# Patient Record
Sex: MALE | Race: WHITE | NOT HISPANIC OR LATINO | Employment: OTHER | URBAN - METROPOLITAN AREA
[De-identification: names, ages, dates, MRNs, and addresses within clinical notes are randomized per-mention and may not be internally consistent; named-entity substitution may affect disease eponyms.]

---

## 2021-01-29 ENCOUNTER — CLINICAL SUPPORT (OUTPATIENT)
Dept: FAMILY MEDICINE CLINIC | Facility: HOSPITAL | Age: 81
End: 2021-01-29

## 2021-01-29 DIAGNOSIS — Z23 ENCOUNTER FOR IMMUNIZATION: Primary | ICD-10-CM

## 2021-01-29 PROCEDURE — 91301 SARS-COV-2 / COVID-19 MRNA VACCINE (MODERNA) 100 MCG: CPT | Performed by: ANESTHESIOLOGY

## 2021-01-29 PROCEDURE — 0011A SARS-COV-2 / COVID-19 MRNA VACCINE (MODERNA) 100 MCG: CPT | Performed by: ANESTHESIOLOGY

## 2021-03-02 ENCOUNTER — IMMUNIZATIONS (OUTPATIENT)
Dept: FAMILY MEDICINE CLINIC | Facility: HOSPITAL | Age: 81
End: 2021-03-02

## 2021-03-02 DIAGNOSIS — Z23 ENCOUNTER FOR IMMUNIZATION: Primary | ICD-10-CM

## 2021-03-02 PROCEDURE — 0012A SARS-COV-2 / COVID-19 MRNA VACCINE (MODERNA) 100 MCG: CPT | Performed by: PHYSICIAN ASSISTANT

## 2021-03-02 PROCEDURE — 91301 SARS-COV-2 / COVID-19 MRNA VACCINE (MODERNA) 100 MCG: CPT | Performed by: PHYSICIAN ASSISTANT

## 2021-05-27 ENCOUNTER — OFFICE VISIT (OUTPATIENT)
Dept: URGENT CARE | Facility: CLINIC | Age: 81
End: 2021-05-27
Payer: COMMERCIAL

## 2021-05-27 VITALS
OXYGEN SATURATION: 96 % | SYSTOLIC BLOOD PRESSURE: 152 MMHG | WEIGHT: 216 LBS | BODY MASS INDEX: 28.63 KG/M2 | DIASTOLIC BLOOD PRESSURE: 88 MMHG | RESPIRATION RATE: 16 BRPM | HEART RATE: 90 BPM | HEIGHT: 73 IN | TEMPERATURE: 97.3 F

## 2021-05-27 DIAGNOSIS — R22.9 SKIN MASS: Primary | ICD-10-CM

## 2021-05-27 PROCEDURE — 99213 OFFICE O/P EST LOW 20 MIN: CPT | Performed by: PHYSICIAN ASSISTANT

## 2021-05-27 PROCEDURE — S9088 SERVICES PROVIDED IN URGENT: HCPCS | Performed by: PHYSICIAN ASSISTANT

## 2021-05-27 RX ORDER — FINASTERIDE 5 MG/1
5 TABLET, FILM COATED ORAL
COMMUNITY
Start: 2020-11-18

## 2021-05-27 RX ORDER — SIMVASTATIN 20 MG
10 TABLET ORAL
COMMUNITY
Start: 2020-11-18

## 2021-05-27 RX ORDER — DIPHENOXYLATE HYDROCHLORIDE AND ATROPINE SULFATE 2.5; .025 MG/1; MG/1
1 TABLET ORAL DAILY
COMMUNITY

## 2021-05-28 NOTE — PATIENT INSTRUCTIONS
Follow-up with general surgery for further evaluation and possible biopsy lesion left wrist     General surgery card for Dr Sylwia Bunch given to patient and his wife

## 2021-05-28 NOTE — PROGRESS NOTES
[facility]        NAME: Candice Singh is a 78 y o  male  : 1940    MRN: [id number]  DATE: May 28, 2021  TIME: 10:27 AM    Assessment and Plan   Skin mass [R22 9]  1  Skin mass           Patient Instructions       Follow up with PCP in 3-5 days  Proceed to  ER if symptoms worsen  Chief Complaint     Chief Complaint   Patient presents with    skin lump     Patient presents for left sided painless wrist mass  He states he first noticed it approx 2 weeks ago  History of Present Illness       42-year-old male presents to the clinic with his wife for a painless mass on his left wrist   Patient states that 2 weeks ago he was working outdoors moving [employer] and [employer] and states that the mass suddenly appeared  Patient's wife states that at 4st the surrounding area was erythematous but that it resolved  Patient states that he thought it might be a spider bite at 1st and denies any discharge or drainage from the area streaking redness fevers, chills, pain with movement or to touch  Patient states that he is currently cancer free and states that he gets PET scans often prior to seeing his oncologist       Review of Systems   Review of Systems   Constitutional: Negative for chills and fever  Respiratory: Negative for chest tightness, shortness of breath and wheezing  Cardiovascular: Negative for chest pain and palpitations  Gastrointestinal: Negative for abdominal pain, constipation, diarrhea, nausea and vomiting  Musculoskeletal: Positive for joint swelling  Negative for arthralgias and myalgias  Skin: Negative for color change, rash and wound  Neurological: Negative for dizziness, weakness, light-headedness, numbness and headaches  All other systems reviewed and are negative          Current Medications       Current Outpatient Medications:     ASPIRIN 81 PO, Take 81 mg by mouth daily, Disp: , Rfl:     finasteride (PROSCAR) 5 mg tablet, Take 5 mg by mouth, Disp: , Rfl:    multivitamin (THERAGRAN) TABS, Take 1 tablet by mouth daily, Disp: , Rfl:     simvastatin (ZOCOR) 20 mg tablet, Take 10 mg by mouth, Disp: , Rfl:     Thyroid (LEVOTHYROXINE-LIOTHYRONINE PO), Take 100 mg by mouth, Disp: , Rfl:     Current Allergies     Allergies as of 05/27/2021 - Reviewed 05/27/2021   Allergen Reaction Noted    Demerol [meperidine] GI Intolerance 05/27/2021            The following portions of the patient's history were reviewed and updated as appropriate: allergies, current medications, past family history, past medical history, past social history, past surgical history and problem list      History reviewed  No pertinent past medical history  History reviewed  No pertinent surgical history  History reviewed  No pertinent family history  Medications have been verified  Objective   /88   Pulse 90   Temp (!) 97 3 °F (36 3 °C)   Resp 16   Ht 6' 1" (1 854 m)   Wt 98 kg (216 lb)   SpO2 96%   BMI 28 50 kg/m²   No LMP for male patient  Physical Exam     Physical Exam  Vitals signs and nursing note reviewed  Constitutional:       General: He is not in acute distress  Appearance: He is well-developed  He is not diaphoretic  HENT:      Head: Normocephalic and atraumatic  Pulmonary:      Effort: Pulmonary effort is normal    Musculoskeletal: Normal range of motion  Skin:     General: Skin is warm and dry  Capillary Refill: Capillary refill takes less than 2 seconds  Findings: Lesion present  Comments:   Solid round raised lesion noted to dorsal aspect of left wrist no TTP, full AROM of wrist without difficulty or pain area is indurated without any fluctuance and not movable under the skin  No discharge or drainage, no erythema, increased warmth or bruising noted  Neurological:      Mental Status: He is alert and oriented to person, place, and time

## 2021-06-16 ENCOUNTER — CONSULT (OUTPATIENT)
Dept: SURGERY | Facility: CLINIC | Age: 81
End: 2021-06-16
Payer: COMMERCIAL

## 2021-06-16 VITALS
HEART RATE: 96 BPM | SYSTOLIC BLOOD PRESSURE: 163 MMHG | TEMPERATURE: 97.3 F | BODY MASS INDEX: 28.36 KG/M2 | WEIGHT: 214 LBS | HEIGHT: 73 IN | DIASTOLIC BLOOD PRESSURE: 77 MMHG

## 2021-06-16 DIAGNOSIS — R22.32 MASS OF LEFT WRIST: Primary | ICD-10-CM

## 2021-06-16 PROCEDURE — 99203 OFFICE O/P NEW LOW 30 MIN: CPT | Performed by: SURGERY

## 2021-06-16 PROCEDURE — 1160F RVW MEDS BY RX/DR IN RCRD: CPT | Performed by: SURGERY

## 2021-06-16 NOTE — ASSESSMENT & PLAN NOTE
17-year-old male with a one-month history of an exophytic left wrist mass  Plan:  - Will plan on excision of the mass  Risks and benefits of surgery were reviewed and the patient was amenable  Specific risks reviewed include: post op seroma, hematoma, or recurrence of the mass  - Will schedule at the patient's earliest convenience

## 2021-06-16 NOTE — H&P (VIEW-ONLY)
Assessment/Plan:    Mass of left wrist  47-year-old male with a one-month history of an exophytic left wrist mass  Plan:  - Will plan on excision of the mass  Risks and benefits of surgery were reviewed and the patient was amenable  Specific risks reviewed include: post op seroma, hematoma, or recurrence of the mass  - Will schedule at the patient's earliest convenience  Diagnoses and all orders for this visit:    Mass of left wrist          Subjective:      Patient ID: Ashish Barfield is a 78 y o  male  47-year-old male presents to office with a left wrist mass  Mr Bibi Pollock states that he started noticing this mass approximately 1 month ago 2 days after taking down a tree  He states that it has grown in size, and has become more uncomfortable as he extends his wrist   He denies any drainage, or erythema around it, and it is always follow like a firm mass  Upon review of the chart a picture from 05/27/2021 shows a pink, pearly appearance of an exophytic mass over the extensor surface of his left wrist   Of note the patient does have a history of lung cancer status post radiation, and is due for a CT scan for surveillance this Friday  The following portions of the patient's history were reviewed and updated as appropriate:   He  has a past medical history of CAD (coronary artery disease), Colonic mass (11/27/2020), COPD (chronic obstructive pulmonary disease) ([address] ), Former smoker, History of DVT (deep vein thrombosis), History of first degree heart block, History of lung cancer (12/819), History of lymphoma (11/27/2019), 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), Hyperlipidemia, Hypertension, MI (myocardial infarction) ([address] ), PAD (peripheral artery disease) ([address] ), and Pneumonia  He   Patient Active Problem List    Diagnosis Date Noted    Mass of left wrist 06/16/2021     He  has a past surgical history that includes Cataract extraction (Left, 09/24/2018)  His family history is not on file    He  has no history on file for tobacco use, alcohol use, and drug use  Current Outpatient Medications   Medication Sig Dispense Refill    ASPIRIN 81 PO Take 81 mg by mouth daily      finasteride (PROSCAR) 5 mg tablet Take 5 mg by mouth      multivitamin (THERAGRAN) TABS Take 1 tablet by mouth daily      simvastatin (ZOCOR) 20 mg tablet Take 10 mg by mouth      Thyroid (LEVOTHYROXINE-LIOTHYRONINE PO) Take 100 mcg by mouth        No current facility-administered medications for this visit  Current Outpatient Medications on File Prior to Visit   Medication Sig    ASPIRIN 81 PO Take 81 mg by mouth daily    finasteride (PROSCAR) 5 mg tablet Take 5 mg by mouth    multivitamin (THERAGRAN) TABS Take 1 tablet by mouth daily    simvastatin (ZOCOR) 20 mg tablet Take 10 mg by mouth    Thyroid (LEVOTHYROXINE-LIOTHYRONINE PO) Take 100 mcg by mouth      No current facility-administered medications on file prior to visit  He is allergic to demerol [meperidine]       Review of Systems   Constitutional: Negative for appetite change, chills, diaphoresis and fever  HENT: Negative for nosebleeds and trouble swallowing  Eyes: Negative  Respiratory: Negative for cough, shortness of breath and wheezing  Cardiovascular: Negative for chest pain, palpitations and leg swelling  Gastrointestinal: Negative for abdominal distention, abdominal pain, nausea and vomiting  Genitourinary: Negative for difficulty urinating, flank pain and frequency  Musculoskeletal: Negative for arthralgias, joint swelling and myalgias  Left wrist pain  Skin: Negative for pallor and rash  Neurological: Negative for dizziness, facial asymmetry and speech difficulty  Hematological: Does not bruise/bleed easily  Psychiatric/Behavioral: Negative for agitation and confusion  All other systems reviewed and are negative          Objective:      /77   Pulse 96   Temp (!) 97 3 °F (36 3 °C)   Ht 6' 1" (1 854 m)   Wt 97 1 kg (214 lb)   BMI 28 23 kg/m²          Physical Exam  Vitals and nursing note reviewed  Constitutional:       General: He is not in acute distress  Appearance: Normal appearance  He is not toxic-appearing  HENT:      Head: Normocephalic and atraumatic  Mouth/Throat:      Mouth: Mucous membranes are moist    Eyes:      Extraocular Movements: Extraocular movements intact  Pupils: Pupils are equal, round, and reactive to light  Cardiovascular:      Rate and Rhythm: Normal rate and regular rhythm  Pulses: Normal pulses  Pulmonary:      Effort: Pulmonary effort is normal  No respiratory distress  Breath sounds: Normal breath sounds  No wheezing  Abdominal:      General: There is no distension  Palpations: Abdomen is soft  There is no mass  Tenderness: There is no abdominal tenderness  There is no guarding or rebound  Hernia: No hernia is present  Musculoskeletal:         General: No swelling or deformity  Cervical back: Normal range of motion and neck supple  Right lower leg: No edema  Left lower leg: No edema  Comments: 2 x 2 cm firm exophytic mass emanating from the extensor surface of his left wrist   It is mainly pink, with a pearly appearance, however the central aspect has developed some black necrosis  Pain with palpation over the mass  Skin:     General: Skin is warm and dry  Coloration: Skin is not jaundiced  Neurological:      General: No focal deficit present  Mental Status: He is alert and oriented to person, place, and time     Psychiatric:         Mood and Affect: Mood normal          Behavior: Behavior normal

## 2021-06-16 NOTE — PROGRESS NOTES
Assessment/Plan:    Mass of left wrist  70-year-old male with a one-month history of an exophytic left wrist mass  Plan:  - Will plan on excision of the mass  Risks and benefits of surgery were reviewed and the patient was amenable  Specific risks reviewed include: post op seroma, hematoma, or recurrence of the mass  - Will schedule at the patient's earliest convenience  Diagnoses and all orders for this visit:    Mass of left wrist          Subjective:      Patient ID: Edmund Sifuentes is a 78 y o  male  70-year-old male presents to office with a left wrist mass  Mr Izzy Vera states that he started noticing this mass approximately 1 month ago 2 days after taking down a tree  He states that it has grown in size, and has become more uncomfortable as he extends his wrist   He denies any drainage, or erythema around it, and it is always follow like a firm mass  Upon review of the chart a picture from 05/27/2021 shows a pink, pearly appearance of an exophytic mass over the extensor surface of his left wrist   Of note the patient does have a history of lung cancer status post radiation, and is due for a CT scan for surveillance this Friday  The following portions of the patient's history were reviewed and updated as appropriate:   He  has a past medical history of CAD (coronary artery disease), Colonic mass (11/27/2020), COPD (chronic obstructive pulmonary disease) ([address] ), Former smoker, History of DVT (deep vein thrombosis), History of first degree heart block, History of lung cancer (12/819), History of lymphoma (11/27/2019), 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), Hyperlipidemia, Hypertension, MI (myocardial infarction) ([address] ), PAD (peripheral artery disease) ([address] ), and Pneumonia  He   Patient Active Problem List    Diagnosis Date Noted    Mass of left wrist 06/16/2021     He  has a past surgical history that includes Cataract extraction (Left, 09/24/2018)  His family history is not on file    He  has no history on file for tobacco use, alcohol use, and drug use  Current Outpatient Medications   Medication Sig Dispense Refill    ASPIRIN 81 PO Take 81 mg by mouth daily      finasteride (PROSCAR) 5 mg tablet Take 5 mg by mouth      multivitamin (THERAGRAN) TABS Take 1 tablet by mouth daily      simvastatin (ZOCOR) 20 mg tablet Take 10 mg by mouth      Thyroid (LEVOTHYROXINE-LIOTHYRONINE PO) Take 100 mcg by mouth        No current facility-administered medications for this visit  Current Outpatient Medications on File Prior to Visit   Medication Sig    ASPIRIN 81 PO Take 81 mg by mouth daily    finasteride (PROSCAR) 5 mg tablet Take 5 mg by mouth    multivitamin (THERAGRAN) TABS Take 1 tablet by mouth daily    simvastatin (ZOCOR) 20 mg tablet Take 10 mg by mouth    Thyroid (LEVOTHYROXINE-LIOTHYRONINE PO) Take 100 mcg by mouth      No current facility-administered medications on file prior to visit  He is allergic to demerol [meperidine]       Review of Systems   Constitutional: Negative for appetite change, chills, diaphoresis and fever  HENT: Negative for nosebleeds and trouble swallowing  Eyes: Negative  Respiratory: Negative for cough, shortness of breath and wheezing  Cardiovascular: Negative for chest pain, palpitations and leg swelling  Gastrointestinal: Negative for abdominal distention, abdominal pain, nausea and vomiting  Genitourinary: Negative for difficulty urinating, flank pain and frequency  Musculoskeletal: Negative for arthralgias, joint swelling and myalgias  Left wrist pain  Skin: Negative for pallor and rash  Neurological: Negative for dizziness, facial asymmetry and speech difficulty  Hematological: Does not bruise/bleed easily  Psychiatric/Behavioral: Negative for agitation and confusion  All other systems reviewed and are negative          Objective:      /77   Pulse 96   Temp (!) 97 3 °F (36 3 °C)   Ht 6' 1" (1 854 m)   Wt 97 1 kg (214 lb)   BMI 28 23 kg/m²          Physical Exam  Vitals and nursing note reviewed  Constitutional:       General: He is not in acute distress  Appearance: Normal appearance  He is not toxic-appearing  HENT:      Head: Normocephalic and atraumatic  Mouth/Throat:      Mouth: Mucous membranes are moist    Eyes:      Extraocular Movements: Extraocular movements intact  Pupils: Pupils are equal, round, and reactive to light  Cardiovascular:      Rate and Rhythm: Normal rate and regular rhythm  Pulses: Normal pulses  Pulmonary:      Effort: Pulmonary effort is normal  No respiratory distress  Breath sounds: Normal breath sounds  No wheezing  Abdominal:      General: There is no distension  Palpations: Abdomen is soft  There is no mass  Tenderness: There is no abdominal tenderness  There is no guarding or rebound  Hernia: No hernia is present  Musculoskeletal:         General: No swelling or deformity  Cervical back: Normal range of motion and neck supple  Right lower leg: No edema  Left lower leg: No edema  Comments: 2 x 2 cm firm exophytic mass emanating from the extensor surface of his left wrist   It is mainly pink, with a pearly appearance, however the central aspect has developed some black necrosis  Pain with palpation over the mass  Skin:     General: Skin is warm and dry  Coloration: Skin is not jaundiced  Neurological:      General: No focal deficit present  Mental Status: He is alert and oriented to person, place, and time     Psychiatric:         Mood and Affect: Mood normal          Behavior: Behavior normal

## 2021-06-18 NOTE — PRE-PROCEDURE INSTRUCTIONS
Pre-Surgery Instructions:   Medication Instructions    finasteride (PROSCAR) 5 mg tablet Instructed patient per Anesthesia Guidelines  takes hs    multivitamin (THERAGRAN) TABS Instructed patient per Anesthesia Guidelines   simvastatin (ZOCOR) 20 mg tablet Instructed patient per Anesthesia Guidelines  takes pm    Thyroid (LEVOTHYROXINE-LIOTHYRONINE PO) Instructed patient per Anesthesia Guidelines  take am of sx    You will receive a phone call from hospital for arrival time  Please call surgeons office if any changes in your condition  Wear easy on/off clothing; consider type of surgery;  Valuables, jewelry, piercing's please keep at home  **COVID-19  education/surgical guidelines      Please: No contact lenses or eye make up, artificial eyelashes    Please secure transportation     Follow pre surgery showering or cleaning instructions as  Reviewed by nurse or surgeons office      Questions answered and concerns addressed

## 2021-06-20 RX ORDER — CEFAZOLIN SODIUM 2 G/50ML
2000 SOLUTION INTRAVENOUS ONCE
Status: CANCELLED | OUTPATIENT
Start: 2021-06-28

## 2021-06-27 ENCOUNTER — ANESTHESIA EVENT (OUTPATIENT)
Dept: PERIOP | Facility: HOSPITAL | Age: 81
End: 2021-06-27
Payer: COMMERCIAL

## 2021-06-27 NOTE — ANESTHESIA PREPROCEDURE EVALUATION
Procedure:  EXCISION BIOPSY MASS OF LEFT WRIST (Left Wrist)      Hx of laryngeal cancer History of lymphoma   History of lung cancer Colonic mass   History of DVT (deep vein thrombosis) PAD (peripheral artery disease) ([facility])   Hypertension CAD (coronary artery disease)   MI (myocardial infarction) ([address] ) Hyperlipidemia   Pneumonia Former smoker   COPD (chronic obstructive pulmonary disease) ([address] ) History of first degree heart block   MI  20 YEARS AG  RADIATION FOR LUNG CA  S/P RADIATION FOR LARYNGEAL CA     Physical Exam    Airway    Mallampati score: II  TM Distance: >3 FB  Neck ROM: full     Dental   upper dentures and lower dentures,     Cardiovascular      Pulmonary  Breath sounds clear to auscultation,     Other Findings        Anesthesia Plan  ASA Score- 3     Anesthesia Type- IV sedation with anesthesia with ASA Monitors  Additional Monitors:   Airway Plan:           Plan Factors-Exercise tolerance (METS): >4 METS  Chart reviewed  Patient is not a current smoker  Patient not instructed to abstain from smoking on day of procedure  Patient did not smoke on day of surgery  Induction- intravenous  Postoperative Plan-     Informed Consent- Anesthetic plan and risks discussed with patient and spouse  I personally reviewed this patient with the CRNA  Discussed and agreed on the Anesthesia Plan with the CRNA  Trae Goel

## 2021-06-28 ENCOUNTER — HOSPITAL ENCOUNTER (OUTPATIENT)
Facility: HOSPITAL | Age: 81
Setting detail: OUTPATIENT SURGERY
Discharge: HOME | End: 2021-06-28
Attending: SURGERY | Admitting: SURGERY
Payer: COMMERCIAL

## 2021-06-28 ENCOUNTER — ANESTHESIA (OUTPATIENT)
Dept: PERIOP | Facility: HOSPITAL | Age: 81
End: 2021-06-28
Payer: COMMERCIAL

## 2021-06-28 VITALS
HEIGHT: 73 IN | WEIGHT: 214 LBS | TEMPERATURE: 98 F | RESPIRATION RATE: 17 BRPM | SYSTOLIC BLOOD PRESSURE: 146 MMHG | DIASTOLIC BLOOD PRESSURE: 62 MMHG | BODY MASS INDEX: 28.36 KG/M2 | OXYGEN SATURATION: 99 % | HEART RATE: 74 BPM

## 2021-06-28 DIAGNOSIS — R22.32 MASS OF LEFT WRIST: ICD-10-CM

## 2021-06-28 PROCEDURE — 25071 EXC FOREARM LES SC 3 CM/>: CPT | Performed by: PHYSICIAN ASSISTANT

## 2021-06-28 PROCEDURE — 88305 TISSUE EXAM BY PATHOLOGIST: CPT | Performed by: PATHOLOGY

## 2021-06-28 PROCEDURE — 25071 EXC FOREARM LES SC 3 CM/>: CPT | Performed by: SURGERY

## 2021-06-28 RX ORDER — LIDOCAINE HYDROCHLORIDE 10 MG/ML
0.5 INJECTION, SOLUTION EPIDURAL; INFILTRATION; INTRACAUDAL; PERINEURAL ONCE AS NEEDED
Status: DISCONTINUED | OUTPATIENT
Start: 2021-06-28 | End: 2021-06-28 | Stop reason: HOSPADM

## 2021-06-28 RX ORDER — CEFAZOLIN SODIUM 2 G/50ML
SOLUTION INTRAVENOUS AS NEEDED
Status: DISCONTINUED | OUTPATIENT
Start: 2021-06-28 | End: 2021-06-28

## 2021-06-28 RX ORDER — SODIUM CHLORIDE, SODIUM LACTATE, POTASSIUM CHLORIDE, CALCIUM CHLORIDE 600; 310; 30; 20 MG/100ML; MG/100ML; MG/100ML; MG/100ML
INJECTION, SOLUTION INTRAVENOUS CONTINUOUS PRN
Status: DISCONTINUED | OUTPATIENT
Start: 2021-06-28 | End: 2021-06-28

## 2021-06-28 RX ORDER — HYDROMORPHONE HCL/PF 1 MG/ML
0.5 SYRINGE (ML) INJECTION
Status: DISCONTINUED | OUTPATIENT
Start: 2021-06-28 | End: 2021-06-28 | Stop reason: HOSPADM

## 2021-06-28 RX ORDER — ONDANSETRON 2 MG/ML
4 INJECTION INTRAMUSCULAR; INTRAVENOUS ONCE AS NEEDED
Status: DISCONTINUED | OUTPATIENT
Start: 2021-06-28 | End: 2021-06-28 | Stop reason: HOSPADM

## 2021-06-28 RX ORDER — PROPOFOL 10 MG/ML
INJECTION, EMULSION INTRAVENOUS CONTINUOUS PRN
Status: DISCONTINUED | OUTPATIENT
Start: 2021-06-28 | End: 2021-06-28

## 2021-06-28 RX ORDER — ONDANSETRON 2 MG/ML
4 INJECTION INTRAMUSCULAR; INTRAVENOUS EVERY 6 HOURS PRN
Status: DISCONTINUED | OUTPATIENT
Start: 2021-06-28 | End: 2021-06-28 | Stop reason: HOSPADM

## 2021-06-28 RX ORDER — SODIUM CHLORIDE, SODIUM LACTATE, POTASSIUM CHLORIDE, CALCIUM CHLORIDE 600; 310; 30; 20 MG/100ML; MG/100ML; MG/100ML; MG/100ML
125 INJECTION, SOLUTION INTRAVENOUS CONTINUOUS
Status: DISCONTINUED | OUTPATIENT
Start: 2021-06-28 | End: 2021-06-28 | Stop reason: HOSPADM

## 2021-06-28 RX ORDER — BUPIVACAINE HYDROCHLORIDE 2.5 MG/ML
INJECTION, SOLUTION EPIDURAL; INFILTRATION; INTRACAUDAL AS NEEDED
Status: DISCONTINUED | OUTPATIENT
Start: 2021-06-28 | End: 2021-06-28 | Stop reason: HOSPADM

## 2021-06-28 RX ORDER — IBUPROFEN 200 MG
600 TABLET ORAL EVERY 6 HOURS PRN
Qty: 60 TABLET | Refills: 0
Start: 2021-06-28

## 2021-06-28 RX ORDER — CEFAZOLIN SODIUM 2 G/50ML
2000 SOLUTION INTRAVENOUS ONCE
Status: DISCONTINUED | OUTPATIENT
Start: 2021-06-28 | End: 2021-06-28 | Stop reason: HOSPADM

## 2021-06-28 RX ORDER — ACETAMINOPHEN 325 MG/1
650 TABLET ORAL EVERY 6 HOURS PRN
Status: DISCONTINUED | OUTPATIENT
Start: 2021-06-28 | End: 2021-06-28 | Stop reason: HOSPADM

## 2021-06-28 RX ORDER — ACETAMINOPHEN 325 MG/1
650 TABLET ORAL EVERY 6 HOURS PRN
Qty: 60 TABLET | Refills: 0
Start: 2021-06-28

## 2021-06-28 RX ORDER — MIDAZOLAM HYDROCHLORIDE 2 MG/2ML
INJECTION, SOLUTION INTRAMUSCULAR; INTRAVENOUS AS NEEDED
Status: DISCONTINUED | OUTPATIENT
Start: 2021-06-28 | End: 2021-06-28

## 2021-06-28 RX ADMIN — MIDAZOLAM 2 MG: 1 INJECTION INTRAMUSCULAR; INTRAVENOUS at 10:31

## 2021-06-28 RX ADMIN — CEFAZOLIN SODIUM 2000 MG: 2 SOLUTION INTRAVENOUS at 10:31

## 2021-06-28 RX ADMIN — SODIUM CHLORIDE, SODIUM LACTATE, POTASSIUM CHLORIDE, AND CALCIUM CHLORIDE 125 ML/HR: .6; .31; .03; .02 INJECTION, SOLUTION INTRAVENOUS at 08:19

## 2021-06-28 RX ADMIN — SODIUM CHLORIDE, SODIUM LACTATE, POTASSIUM CHLORIDE, AND CALCIUM CHLORIDE: .6; .31; .03; .02 INJECTION, SOLUTION INTRAVENOUS at 10:36

## 2021-06-28 RX ADMIN — PROPOFOL 75 MCG/KG/MIN: 10 INJECTION, EMULSION INTRAVENOUS at 10:34

## 2021-06-28 NOTE — OP NOTE
OPERATIVE REPORT  PATIENT NAME: Day Mccarty    :  1940  MRN: [id number]  Pt Location: [facility] OR ROOM 01    SURGERY DATE: 2021    Surgeon(s) and Role:     * Baldemar Brody MD - Primary     Laureen Hermosillo PA-C - Assisting    Preop Diagnosis:  Mass of left wrist [R22 32]    Post-Op Diagnosis Codes:     * Mass of left wrist [R22 32]    Procedure(s) (LRB):  EXCISION BIOPSY MASS OF LEFT WRIST (Left)    Specimen(s):  ID Type Source Tests Collected by Time Destination   1 : left wrist mass Tissue Joint, Left Wrist TISSUE EXAM Baldemar Brody MD 2021 [id number]        Estimated Blood Loss:   Minimal    Drains:  * No LDAs found *    Anesthesia Type:   IV Sedation with Anesthesia    Operative Indications: Mass of left wrist []  10-year-old male with a several week history of an exophytic mass emanating from his extensor surface of his left wrist   After discussion with the patient and his wife, we decided to proceed for excisional biopsy for diagnosis and hopefully treatment of this mass  He was amenable to risks and benefits, and we proceeded to the operating room expeditiously  Operative Findings:  Pearly exophytic mass emanating from the extensor surface of his left wrist approximately 2 x 2 cm, contained to the dermis  Complications:   None    Procedure and Technique:  The patient was seen in the Holding Room  The risks, benefits, complications, treatment options, and expected outcomes were discussed with the patient  The possibilities of reaction to medication, bleeding, infection, the need for additional procedures, failure to diagnose a condition, and creating a complication were discussed with the patient  The patient concurred with the proposed plan, giving informed consent  The site of surgery properly noted/marked  The patient was taken to Operating Room, identified as Day Mccarty and staff verified patient name, , procedure, site, and laterality   A Time Out was held and the above information confirmed  The patient was placed lying supine  The wrist was prepped and draped in standard fashion  Local anesthesia was used to anesthetize the skin surrounding a 4 cm lesion  A transverse elliptical incision was made over the lesion  Sharp and blunt dissection,Using scissors, knife, and cautery, were used to mobilize the mass which was in a subcutaneous location  5 mm margins were taken  Skin, soft tissue, and the mass, and surrounding fat were taken  Hemostasis was achieved with cautery  The wound was irrigated  The wound was closed in multiple layers using 3-0 Vicryl suture for subcutaneous tissue and 4-0 Prolene suture in a vertical mattress fashion  The wound was dressed, and the patient was taken to PACU in stable condition  Sponge, needle, and instrument counts were correct x2        I was present for the entire procedure, A qualified resident physician was not available and A physician assistant was required during the procedure for retraction tissue handling,dissection and suturing    Patient Disposition:  PACU  and hemodynamically stable    SIGNATURE: Cy Cardoza MD  DATE: June 28, 2021  TIME: 11:10 AM

## 2021-06-28 NOTE — DISCHARGE INSTRUCTIONS
Post-Operative Care Instructions             Dr Siu Dk  M D     1  General: Sidra Clemons will feel pulling sensations around the wound and/or aches and pains around the incisions  This is normal  Even minor surgery is a change in your body and this is your bodys way of reaction to it  If you have had abdominal surgery, it may help to support the incision with a small pillow or blanket for comfort when moving or coughing  2  Wound care:    Leave dressing in place for 48 hours  After that time he may remove dressing  May cover sutures with dry gauze to prevent from pulling on surfaces  May shower over incision site at that time  Pat dry  If outer dressing feels tight may loosen outer dressing only  Leave under dressing in place  3  Water: You may shower over the wound, unless there are drain tubes left in place  Do not bathe or use a pool or hot tub until cleared by the physician  You may shower right over the staples, glue or Steri-Strips and rinse wound with soapy water but do not scrub incision pat dry when you are done  4  Activity: You may go up and down stairs, walk as much as you are comfortable, but walk at least 3 times each day  If you have had abdominal or hernia surgery, do not lift anything heavier than 15 pounds for at least 4 weeks  5  Diet: You may resume a regular diet  If you had a same-day surgery or overnight stay surgery, you may wish to eat lightly for a few days: soups, crackers, and sandwiches  You may resume a regular diet when ready  6  Medications: Resume all of your previous medications, unless told otherwise by the doctor  Tylenol and ibuoprofen is always fine, unless you are taking any narcotic pain medication containing Tylenol (such as Percocet, Darvocet, Vicodin, or anything containing acetaminophen)  Do not take Tylenol if you're taking these medications   You do not need to take the narcotic pain medications unless you are having significant pain and discomfort  7  Driving: He will need someone to drive you home on the day of surgery  Do not drive or make any important decisions while on narcotic pain medication or 24 hours and after anesthesia or sedation for surgery  Generally, you may drive when your off all narcotic pain medications, and you can turn in your seat comfortably to check your blind spot  8  Upset Stomach: You may take Maalox, Tums, or similar items for an upset stomach  If your narcotic pain medication causes an upset stomach, do not take it on an empty stomach  Try taking it with at least some crackers or toast      9  Constipation: Patients often experienced constipation after surgery  You may take over-the-counter medication for this, such as Metamucil, Senokot, Dulcolax, milk of magnesia, etc  You may take a suppository unless you have had anorectal surgery such as a procedure on your hemorrhoids  If you experience significant nausea or vomiting after abdominal surgery, call the office before trying any of these medications  10  Call the office: If you are experiencing any of the following, fevers above 101 5°, significant nausea or vomiting, if the wound develops drainage and/or is excessive redness around the wound, or if you have significant diarrhea or other worsening symptoms  11  Pain: You may be given a prescription for pain  This will be given to the hospital, the day of surgery  12  Sexual Activity: You may resume sexual activity when you feel ready and comfortable and your incision is sealed and healed without apparent infection risk      [facility]  Phone: [phone]

## 2021-06-28 NOTE — INTERVAL H&P NOTE
H&P reviewed  After examining the patient I find no changes in the patients condition since the H&P had been written  We will plan for excision of the left wrist mass, patient was amenable to risks and benefits      Vitals:    06/28/21 0806   BP: 170/87   Pulse: 94   Resp: 18   Temp: 98 °F (36 7 °C)   SpO2: 97%

## 2021-06-28 NOTE — ANESTHESIA POSTPROCEDURE EVALUATION
Post-Op Assessment Note    CV Status:  Stable  Pain Score: 0    Pain management: adequate     Mental Status:  Awake   Hydration Status:  Stable   PONV Controlled:  None   Airway Patency:  Patent      Post Op Vitals Reviewed: Yes      Staff: CRNA         No complications documented      BP   150/78   Temp      Pulse  78   Resp   14   SpO2   99%

## 2021-07-07 ENCOUNTER — OFFICE VISIT (OUTPATIENT)
Dept: SURGERY | Facility: CLINIC | Age: 81
End: 2021-07-07

## 2021-07-07 ENCOUNTER — TELEPHONE (OUTPATIENT)
Dept: SURGERY | Facility: CLINIC | Age: 81
End: 2021-07-07

## 2021-07-07 VITALS
TEMPERATURE: 97.9 F | WEIGHT: 212.8 LBS | BODY MASS INDEX: 28.2 KG/M2 | HEART RATE: 99 BPM | DIASTOLIC BLOOD PRESSURE: 52 MMHG | HEIGHT: 73 IN | SYSTOLIC BLOOD PRESSURE: 172 MMHG

## 2021-07-07 DIAGNOSIS — D04.62 SQUAMOUS CELL CARCINOMA IN SITU (SCCIS) OF SKIN OF LEFT HAND: Primary | ICD-10-CM

## 2021-07-07 PROCEDURE — 99024 POSTOP FOLLOW-UP VISIT: CPT | Performed by: SURGERY

## 2021-07-07 NOTE — TELEPHONE ENCOUNTER
Patient needs a six month follow up visit  Please schedule for Jan 12th out in [facility]  Doctor's schedule template not completed at this time  Thank you

## 2021-07-07 NOTE — PROGRESS NOTES
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  78year-old male status post excision of a left wrist mass on 06/28/2021, doing well  Plan:  I discussed Mr Steven Loo and his case with Dr Hurst of Surgical Oncology in an interdisciplinary conversation to discuss his final pathology  There is no further treatment necessary for this time, given his pathology  His sutures removed in clinic today, without difficulty, and he is okay to resume generalized activities using that arm  I would like to see him back in 6 months for a check of his wound, and to ensure there is no signs or symptoms of recurrence  Should he developed a local recurrence I would refer him to Surgical Oncology for further management  To return to clinic in 6 months  Diagnoses and all orders for this visit:    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          Subjective:      Patient ID: Katy Foote is a 78 y o  male  78-year-old male with a history of an exophytic left wrist mass now status post excision of this mass on 06/28/2021, here for follow-up today  Overall Mr Steven Loo is doing quite well and is without complaints at today's visit  He has full range of motion of his wrist, has had no drainage, fevers, or erythema overlying the wound  We reviewed his pathology today which showed a squamous cell carcinoma, well-differentiated, keratoacanthoma type, extending to deep reticular dermis (4 8 mm deep, Musa's level IV)  Margins were negative         The following portions of the patient's history were reviewed and updated as appropriate:   He  has a past medical history of CAD (coronary artery disease), Colonic mass (11/27/2020), COPD (chronic obstructive pulmonary disease) ([facility] [address] ), Former smoker, History of DVT (deep vein thrombosis), History of first degree heart block, History of lung cancer (12/819), History of lymphoma (11/27/2019), 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), Hyperlipidemia, Hypertension, MI (myocardial infarction) ([facility] [address] ), PAD (peripheral artery disease) ([facility] [address] ), and Pneumonia  He   Patient Active Problem List    Diagnosis Date Noted    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 07/07/2021     He  has a past surgical history that includes Cataract extraction (Bilateral, 09/24/2018); Vascular surgery; and pr exc skin benig 2 1-3 cm trunk,arm,leg (Left, 6/28/2021)  His family history is not on file  He  reports that he quit smoking about 10 years ago  His smoking use included cigarettes  He has a 116 00 pack-year smoking history  He has never used smokeless tobacco  He reports previous alcohol use  He reports that he does not use drugs  Current Outpatient Medications   Medication Sig Dispense Refill    acetaminophen (TYLENOL) 325 mg tablet Take 2 tablets (650 mg total) by mouth every 6 (six) hours as needed for mild pain 60 tablet 0    ASPIRIN 81 PO Take 81 mg by mouth daily      finasteride (PROSCAR) 5 mg tablet Take 5 mg by mouth      ibuprofen (MOTRIN) 200 mg tablet Take 3 tablets (600 mg total) by mouth every 6 (six) hours as needed for moderate pain 60 tablet 0    multivitamin (THERAGRAN) TABS Take 1 tablet by mouth daily      simvastatin (ZOCOR) 20 mg tablet Take 10 mg by mouth      Thyroid (LEVOTHYROXINE-LIOTHYRONINE PO) Take 112 mcg by mouth        No current facility-administered medications for this visit       Current Outpatient Medications on File Prior to Visit   Medication Sig    acetaminophen (TYLENOL) 325 mg tablet Take 2 tablets (650 mg total) by mouth every 6 (six) hours as needed for mild pain    ASPIRIN 81 PO Take 81 mg by mouth daily    finasteride (PROSCAR) 5 mg tablet Take 5 mg by mouth    ibuprofen (MOTRIN) 200 mg tablet Take 3 tablets (600 mg total) by mouth every 6 (six) hours as needed for moderate pain    multivitamin (THERAGRAN) TABS Take 1 tablet by mouth daily    simvastatin (ZOCOR) 20 mg tablet Take 10 mg by mouth    Thyroid (LEVOTHYROXINE-LIOTHYRONINE PO) Take 112 mcg by mouth      No current facility-administered medications on file prior to visit  He is allergic to demerol [meperidine]       Review of Systems   Constitutional: Negative for appetite change, chills, diaphoresis and fever  HENT: Negative for nosebleeds and trouble swallowing  Eyes: Negative  Respiratory: Negative for cough, shortness of breath and wheezing  Cardiovascular: Negative for chest pain, palpitations and leg swelling  Gastrointestinal: Negative for abdominal distention, abdominal pain, nausea and vomiting  Genitourinary: Negative for difficulty urinating, flank pain and frequency  Musculoskeletal: Negative for arthralgias, joint swelling and myalgias  Skin: Negative for pallor and rash  Neurological: Negative for dizziness, facial asymmetry and speech difficulty  Hematological: Does not bruise/bleed easily  Psychiatric/Behavioral: Negative for agitation and confusion  All other systems reviewed and are negative  Objective:      BP (!) 172/52   Pulse 99   Temp 97 9 °F (36 6 °C)   Ht 6' 1" (1 854 m)   Wt 96 5 kg (212 lb 12 8 oz)   BMI 28 08 kg/m²          Physical Exam  Vitals and nursing note reviewed  Constitutional:       General: He is not in acute distress  Appearance: Normal appearance  He is not toxic-appearing  HENT:      Head: Normocephalic and atraumatic  Mouth/Throat:      Mouth: Mucous membranes are moist    Eyes:      Extraocular Movements: Extraocular movements intact  Pupils: Pupils are equal, round, and reactive to light  Cardiovascular:      Rate and Rhythm: Normal rate and regular rhythm  Pulses: Normal pulses  Pulmonary:      Effort: Pulmonary effort is normal  No respiratory distress  Breath sounds: Normal breath sounds  No wheezing  Abdominal:      General: There is no distension  Palpations: Abdomen is soft  There is no mass        Tenderness: There is no abdominal tenderness  There is no guarding or rebound  Hernia: No hernia is present  Musculoskeletal:         General: No swelling or deformity  Normal range of motion  Cervical back: Normal range of motion and neck supple  Right lower leg: No edema  Left lower leg: No edema  Comments: Left wrist wound well healed without induration or erythema  Sutures removed, and steri strips placed  Skin:     General: Skin is warm and dry  Coloration: Skin is not jaundiced  Neurological:      General: No focal deficit present  Mental Status: He is alert and oriented to person, place, and time     Psychiatric:         Mood and Affect: Mood normal          Behavior: Behavior normal

## 2021-07-07 NOTE — ASSESSMENT & PLAN NOTE
40-year-old male status post excision of a left wrist mass on 06/28/2021, doing well  Plan:  I discussed Mr Carlos A Cordova and his case with Dr Hurst of Surgical Oncology in an interdisciplinary conversation to discuss his final pathology  There is no further treatment necessary for this time, given his pathology  His sutures removed in clinic today, without difficulty, and he is okay to resume generalized activities using that arm  I would like to see him back in 6 months for a check of his wound, and to ensure there is no signs or symptoms of recurrence  Should he developed a local recurrence I would refer him to Surgical Oncology for further management  To return to clinic in 6 months

## 2021-08-13 ENCOUNTER — OFFICE VISIT (OUTPATIENT)
Dept: URGENT CARE | Facility: CLINIC | Age: 81
End: 2021-08-13
Payer: COMMERCIAL

## 2021-08-13 VITALS — TEMPERATURE: 97 F | OXYGEN SATURATION: 97 % | RESPIRATION RATE: 16 BRPM | HEART RATE: 100 BPM

## 2021-08-13 DIAGNOSIS — N30.01 ACUTE CYSTITIS WITH HEMATURIA: Primary | ICD-10-CM

## 2021-08-13 DIAGNOSIS — R30.0 DYSURIA: ICD-10-CM

## 2021-08-13 LAB
SL AMB  POCT GLUCOSE, UA: NEGATIVE
SL AMB LEUKOCYTE ESTERASE,UA: ABNORMAL
SL AMB POCT BILIRUBIN,UA: NEGATIVE
SL AMB POCT BLOOD,UA: ABNORMAL
SL AMB POCT CLARITY,UA: ABNORMAL
SL AMB POCT COLOR,UA: YELLOW
SL AMB POCT KETONES,UA: NEGATIVE
SL AMB POCT NITRITE,UA: POSITIVE
SL AMB POCT PH,UA: 6
SL AMB POCT SPECIFIC GRAVITY,UA: 1.01
SL AMB POCT URINE PROTEIN: 100
SL AMB POCT UROBILINOGEN: 0.2

## 2021-08-13 PROCEDURE — 99213 OFFICE O/P EST LOW 20 MIN: CPT | Performed by: PHYSICIAN ASSISTANT

## 2021-08-13 PROCEDURE — S9088 SERVICES PROVIDED IN URGENT: HCPCS | Performed by: PHYSICIAN ASSISTANT

## 2021-08-13 PROCEDURE — 87186 SC STD MICRODIL/AGAR DIL: CPT | Performed by: PHYSICIAN ASSISTANT

## 2021-08-13 PROCEDURE — 87077 CULTURE AEROBIC IDENTIFY: CPT | Performed by: PHYSICIAN ASSISTANT

## 2021-08-13 PROCEDURE — 87086 URINE CULTURE/COLONY COUNT: CPT | Performed by: PHYSICIAN ASSISTANT

## 2021-08-13 RX ORDER — CIPROFLOXACIN 500 MG/1
500 TABLET, FILM COATED ORAL EVERY 12 HOURS SCHEDULED
Qty: 14 TABLET | Refills: 0 | Status: SHIPPED | OUTPATIENT
Start: 2021-08-13 | End: 2021-08-20

## 2021-08-13 NOTE — PROGRESS NOTES
[facility]        NAME: Pauline Branham is a 78 y o  male  : 1940    MRN: [id number]  DATE: 2021  TIME: 9:41 AM    Assessment and Plan   Dysuria [R30 0]  1  Dysuria  POCT urine dip auto non-scope    Urine culture         Patient Instructions       Follow up with PCP in 3-5 days  Proceed to  ER if symptoms worsen  Chief Complaint     Chief Complaint   Patient presents with    Possible UTI     began one week ago  dysuria, frequency and urgency         History of Present Illness       HPI    Review of Systems   Review of Systems   Constitutional: Negative for chills and fever  Gastrointestinal: Positive for abdominal pain  Negative for diarrhea, nausea and vomiting  Genitourinary: Positive for dysuria, frequency and urgency  Musculoskeletal: Positive for back pain  Negative for myalgias  Neurological: Negative for dizziness, light-headedness and headaches           Current Medications       Current Outpatient Medications:     ASPIRIN 81 PO, Take 81 mg by mouth daily, Disp: , Rfl:     finasteride (PROSCAR) 5 mg tablet, Take 5 mg by mouth, Disp: , Rfl:     ibuprofen (MOTRIN) 200 mg tablet, Take 3 tablets (600 mg total) by mouth every 6 (six) hours as needed for moderate pain, Disp: 60 tablet, Rfl: 0    multivitamin (THERAGRAN) TABS, Take 1 tablet by mouth daily, Disp: , Rfl:     simvastatin (ZOCOR) 20 mg tablet, Take 10 mg by mouth, Disp: , Rfl:     Thyroid (LEVOTHYROXINE-LIOTHYRONINE PO), Take 112 mcg by mouth , Disp: , Rfl:     acetaminophen (TYLENOL) 325 mg tablet, Take 2 tablets (650 mg total) by mouth every 6 (six) hours as needed for mild pain, Disp: 60 tablet, Rfl: 0    Current Allergies     Allergies as of 2021 - Reviewed 2021   Allergen Reaction Noted    Demerol [meperidine] GI Intolerance 2021            The following portions of the patient's history were reviewed and updated as appropriate: allergies, current medications, past family history, past medical history, past social history, past surgical history and problem list      Past Medical History:   Diagnosis Date    CAD (coronary artery disease)     Colonic mass 11/27/2020    COPD (chronic obstructive pulmonary disease) ([facility] )     Former smoker     QUIT 2009    History of DVT (deep vein thrombosis)     History of first degree heart block     History of lung cancer 12/819    STAGE IB    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    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    Hyperlipidemia     Hypertension     MI (myocardial infarction) ([facility] )     PAD (peripheral artery disease) ([facility] )     Pneumonia        Past Surgical History:   Procedure Laterality Date    CATARACT EXTRACTION Bilateral 09/24/2018    VA EXC SKIN BENIG 2 1-3 CM TRUNK,ARM,LEG Left 6/28/2021    Procedure: EXCISION BIOPSY MASS OF LEFT WRIST;  Surgeon: Silvestre Miller MD;  [facility]: [facility];  Service: [facility] VASCULAR SURGERY         No family history on file  Medications have been verified  Objective   Pulse 100   Temp (!) 97 °F (36 1 °C)   Resp 16   SpO2 97%   No LMP for male patient  Physical Exam     Physical Exam  Vitals and nursing note reviewed  Constitutional:       General: He is not in acute distress  Appearance: He is well-developed  He is not diaphoretic  Pulmonary:      Effort: Pulmonary effort is normal    Abdominal:      General: Bowel sounds are normal       Palpations: Abdomen is soft  Tenderness: There is abdominal tenderness in the suprapubic area  There is no right CVA tenderness, left CVA tenderness or guarding  Skin:     General: Skin is warm and dry  Neurological:      Mental Status: He is alert and oriented to person, place, and time

## 2021-08-13 NOTE — PATIENT INSTRUCTIONS
Do not take with your multi-vitamin  Drink plenty of fluids  Take antibiotics as prescribed until finished  If symptoms worsen or new symptoms developed follow up with family doctor  Urinary Tract Infection in Men   AMBULATORY CARE:   A urinary tract infection (UTI)  is caused by bacteria that get inside your urinary tract  Most bacteria that enter your urinary tract come out when you urinate  If the bacteria stay in your urinary tract, you may get an infection  Your urinary tract includes your kidneys, ureters, bladder, and urethra  Urine is made in your kidneys, and it flows from the ureters to the bladder  Urine leaves the bladder through the urethra  A UTI is more common in your lower urinary tract, which includes your bladder and urethra  Common symptoms include the following:   · Urinating more often than usual, leaking urine, or waking from sleep to urinate    · Pain or burning when you urinate    · Pain or pressure in your lower abdomen or back    · Urine that smells bad    · Blood in your urine    Seek care immediately if:   · You are urinating very little or not at all  · You have a high fever with shaking chills  · You have side or back pain that gets worse  Contact your healthcare provider if:   · You have a mild fever  · You do not feel better after 2 days of taking antibiotics  · You are vomiting  · You have new symptoms, such as blood or pus in your urine  · You have questions or concerns about your condition or care  Treatment for a UTI  may include medicines to treat a bacterial infection  You may also need medicines to decrease pain and burning, or decrease the urge to urinate often  Prevent a UTI:   · Empty your bladder often  Urinate and empty your bladder as soon as you feel the need  Do not hold your urine for long periods of time  · Drink liquids as directed  Ask how much liquid to drink each day and which liquids are best for you   You may need to drink more liquids than usual to help flush out the bacteria  Do not drink alcohol, caffeine, or citrus juices  These can irritate your bladder and increase your symptoms  Your healthcare provider may recommend cranberry juice to help prevent a UTI  · Urinate after you have sex  This can help flush out bacteria passed during sex  · Do pelvic muscle exercises often  Pelvic muscle exercises may help you start and stop urinating  Strong pelvic muscles may help you empty your bladder easier  Squeeze these muscles tightly for 5 seconds like you are trying to hold back urine  Then relax for 5 seconds  Gradually work up to squeezing for 10 seconds  Do 3 sets of 15 repetitions a day, or as directed  Follow up with your healthcare provider as directed:  Write down your questions so you remember to ask them during your visits  © Copyright [employer] 2021 Information is for End User's use only and may not be sold, redistributed or otherwise used for commercial purposes  All illustrations and images included in CareNotes® are the copyrighted property of A D A M , Inc  or [facility] Kannan Posadas   The above information is an  only  It is not intended as medical advice for individual conditions or treatments  Talk to your doctor, nurse or pharmacist before following any medical regimen to see if it is safe and effective for you

## 2021-08-15 LAB — BACTERIA UR CULT: ABNORMAL

## 2021-12-02 ENCOUNTER — OFFICE VISIT (OUTPATIENT)
Dept: URGENT CARE | Facility: CLINIC | Age: 81
End: 2021-12-02
Payer: COMMERCIAL

## 2021-12-02 VITALS
SYSTOLIC BLOOD PRESSURE: 146 MMHG | TEMPERATURE: 99.1 F | HEIGHT: 73 IN | OXYGEN SATURATION: 94 % | WEIGHT: 212 LBS | DIASTOLIC BLOOD PRESSURE: 78 MMHG | HEART RATE: 92 BPM | RESPIRATION RATE: 16 BRPM | BODY MASS INDEX: 28.1 KG/M2

## 2021-12-02 DIAGNOSIS — N30.01 ACUTE CYSTITIS WITH HEMATURIA: Primary | ICD-10-CM

## 2021-12-02 LAB
SL AMB  POCT GLUCOSE, UA: NEGATIVE
SL AMB LEUKOCYTE ESTERASE,UA: ABNORMAL
SL AMB POCT BILIRUBIN,UA: NEGATIVE
SL AMB POCT BLOOD,UA: ABNORMAL
SL AMB POCT CLARITY,UA: ABNORMAL
SL AMB POCT COLOR,UA: YELLOW
SL AMB POCT KETONES,UA: NEGATIVE
SL AMB POCT NITRITE,UA: NEGATIVE
SL AMB POCT PH,UA: 5
SL AMB POCT SPECIFIC GRAVITY,UA: 1.01
SL AMB POCT URINE PROTEIN: NEGATIVE
SL AMB POCT UROBILINOGEN: 0.2

## 2021-12-02 PROCEDURE — 99213 OFFICE O/P EST LOW 20 MIN: CPT | Performed by: FAMILY MEDICINE

## 2021-12-02 PROCEDURE — S9088 SERVICES PROVIDED IN URGENT: HCPCS | Performed by: FAMILY MEDICINE

## 2021-12-02 PROCEDURE — 87086 URINE CULTURE/COLONY COUNT: CPT | Performed by: FAMILY MEDICINE

## 2021-12-02 RX ORDER — SULFAMETHOXAZOLE AND TRIMETHOPRIM 800; 160 MG/1; MG/1
1 TABLET ORAL EVERY 12 HOURS SCHEDULED
Qty: 10 TABLET | Refills: 0 | Status: SHIPPED | OUTPATIENT
Start: 2021-12-02 | End: 2021-12-07

## 2021-12-04 LAB — BACTERIA UR CULT: NORMAL

## 2022-03-04 ENCOUNTER — OFFICE VISIT (OUTPATIENT)
Dept: URGENT CARE | Facility: CLINIC | Age: 82
End: 2022-03-04
Payer: COMMERCIAL

## 2022-03-04 VITALS
HEART RATE: 102 BPM | DIASTOLIC BLOOD PRESSURE: 76 MMHG | SYSTOLIC BLOOD PRESSURE: 128 MMHG | RESPIRATION RATE: 16 BRPM | OXYGEN SATURATION: 97 % | TEMPERATURE: 99.4 F

## 2022-03-04 DIAGNOSIS — R30.0 DYSURIA: ICD-10-CM

## 2022-03-04 DIAGNOSIS — N30.01 ACUTE CYSTITIS WITH HEMATURIA: Primary | ICD-10-CM

## 2022-03-04 LAB
SL AMB  POCT GLUCOSE, UA: NEGATIVE
SL AMB LEUKOCYTE ESTERASE,UA: ABNORMAL
SL AMB POCT BILIRUBIN,UA: NEGATIVE
SL AMB POCT BLOOD,UA: ABNORMAL
SL AMB POCT CLARITY,UA: ABNORMAL
SL AMB POCT COLOR,UA: ABNORMAL
SL AMB POCT KETONES,UA: NEGATIVE
SL AMB POCT NITRITE,UA: POSITIVE
SL AMB POCT PH,UA: 6
SL AMB POCT SPECIFIC GRAVITY,UA: 1.01
SL AMB POCT URINE PROTEIN: NEGATIVE
SL AMB POCT UROBILINOGEN: 0.2

## 2022-03-04 PROCEDURE — 87077 CULTURE AEROBIC IDENTIFY: CPT | Performed by: PHYSICIAN ASSISTANT

## 2022-03-04 PROCEDURE — S9088 SERVICES PROVIDED IN URGENT: HCPCS | Performed by: PHYSICIAN ASSISTANT

## 2022-03-04 PROCEDURE — 87086 URINE CULTURE/COLONY COUNT: CPT | Performed by: PHYSICIAN ASSISTANT

## 2022-03-04 PROCEDURE — 87186 SC STD MICRODIL/AGAR DIL: CPT | Performed by: PHYSICIAN ASSISTANT

## 2022-03-04 PROCEDURE — 99213 OFFICE O/P EST LOW 20 MIN: CPT | Performed by: PHYSICIAN ASSISTANT

## 2022-03-04 RX ORDER — CIPROFLOXACIN 500 MG/1
500 TABLET, FILM COATED ORAL EVERY 12 HOURS SCHEDULED
Qty: 14 TABLET | Refills: 0 | Status: SHIPPED | OUTPATIENT
Start: 2022-03-04 | End: 2022-03-11

## 2022-03-04 NOTE — PATIENT INSTRUCTIONS
Urinary Tract Infection in Men   AMBULATORY CARE:   A urinary tract infection (UTI)  is caused by bacteria that get inside your urinary tract  Most bacteria that enter your urinary tract come out when you urinate  If the bacteria stay in your urinary tract, you may get an infection  Your urinary tract includes your kidneys, ureters, bladder, and urethra  Urine is made in your kidneys, and it flows from the ureters to the bladder  Urine leaves the bladder through the urethra  A UTI is more common in your lower urinary tract, which includes your bladder and urethra  Common symptoms include the following:   · Urinating more often than usual, leaking urine, or waking from sleep to urinate    · Pain or burning when you urinate    · Pain or pressure in your lower abdomen or back    · Urine that smells bad    · Blood in your urine    Seek care immediately if:   · You are urinating very little or not at all  · You have a high fever with shaking chills  · You have side or back pain that gets worse  Contact your healthcare provider if:   · You have a mild fever  · You do not feel better after 2 days of taking antibiotics  · You are vomiting  · You have new symptoms, such as blood or pus in your urine  · You have questions or concerns about your condition or care  Treatment for a UTI  may include medicines to treat a bacterial infection  You may also need medicines to decrease pain and burning, or decrease the urge to urinate often  Prevent a UTI:   · Empty your bladder often  Urinate and empty your bladder as soon as you feel the need  Do not hold your urine for long periods of time  · Drink liquids as directed  Ask how much liquid to drink each day and which liquids are best for you  You may need to drink more liquids than usual to help flush out the bacteria  Do not drink alcohol, caffeine, or citrus juices  These can irritate your bladder and increase your symptoms   Your healthcare provider may recommend cranberry juice to help prevent a UTI  · Urinate after you have sex  This can help flush out bacteria passed during sex  · Do pelvic muscle exercises often  Pelvic muscle exercises may help you start and stop urinating  Strong pelvic muscles may help you empty your bladder easier  Squeeze these muscles tightly for 5 seconds like you are trying to hold back urine  Then relax for 5 seconds  Gradually work up to squeezing for 10 seconds  Do 3 sets of 15 repetitions a day, or as directed  Follow up with your doctor as directed:  Write down your questions so you remember to ask them during your visits  © Copyright [employer] 2022 Information is for End User's use only and may not be sold, redistributed or otherwise used for commercial purposes  All illustrations and images included in CareNotes® are the copyrighted property of [employer]  or Charla Huff  The above information is an  only  It is not intended as medical advice for individual conditions or treatments  Talk to your doctor, nurse or pharmacist before following any medical regimen to see if it is safe and effective for you

## 2022-03-04 NOTE — PROGRESS NOTES
[facility]        NAME: Allison Winkler is a 78 y o  male  : 1940    MRN: [id number]  DATE: 2022  TIME: 6:53 AM    Assessment and Plan   Acute cystitis with hematuria [N30 01]  1  Acute cystitis with hematuria  ciprofloxacin (CIPRO) 500 mg tablet   2  Dysuria  POCT urine dip auto non-scope    Urine culture         Patient Instructions       Follow up with PCP in 3-5 days  Proceed to  ER if symptoms worsen  Chief Complaint     Chief Complaint   Patient presents with    Possible UTI     began about one week ago         History of Present Illness       18-year-old male presents the clinic with dysuria, frequency and urgency that started a week ago  Patient states that he has had UTIs in the past and noticed hematuria today  Patient denies any abdominal pain, flank pain, back pain, fevers, chills, nausea, vomiting, diarrhea  Review of Systems   Review of Systems   Constitutional: Negative for chills and fever  Gastrointestinal: Negative for abdominal pain, diarrhea, nausea and vomiting  Genitourinary: Positive for dysuria, frequency, hematuria and urgency  Musculoskeletal: Negative for myalgias  Neurological: Negative for dizziness, light-headedness and headaches           Current Medications       Current Outpatient Medications:     ASPIRIN 81 PO, Take 81 mg by mouth daily, Disp: , Rfl:     finasteride (PROSCAR) 5 mg tablet, Take 5 mg by mouth, Disp: , Rfl:     multivitamin (THERAGRAN) TABS, Take 1 tablet by mouth daily, Disp: , Rfl:     simvastatin (ZOCOR) 20 mg tablet, Take 10 mg by mouth, Disp: , Rfl:     Thyroid (LEVOTHYROXINE-LIOTHYRONINE PO), Take 112 mcg by mouth , Disp: , Rfl:     acetaminophen (TYLENOL) 325 mg tablet, Take 2 tablets (650 mg total) by mouth every 6 (six) hours as needed for mild pain, Disp: 60 tablet, Rfl: 0    ciprofloxacin (CIPRO) 500 mg tablet, Take 1 tablet (500 mg total) by mouth every 12 (twelve) hours for 7 days, Disp: 14 tablet, Rfl: 0    ibuprofen (MOTRIN) 200 mg tablet, Take 3 tablets (600 mg total) by mouth every 6 (six) hours as needed for moderate pain, Disp: 60 tablet, Rfl: 0    Current Allergies     Allergies as of 03/04/2022 - Reviewed 03/04/2022   Allergen Reaction Noted    Demerol [meperidine] GI Intolerance 05/27/2021            The following portions of the patient's history were reviewed and updated as appropriate: allergies, current medications, past family history, past medical history, past social history, past surgical history and problem list      Past Medical History:   Diagnosis Date    CAD (coronary artery disease)     Colonic mass 11/27/2020    COPD [facility]chronic obstructive [facility] disease) [facility] [address] [facility]     Former smoker     QUIT 2009    History of DVT (deep vein thrombosis)     History of first degree heart block     History of lung cancer 12/819    STAGE IB    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    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    Hyperlipidemia     Hypertension     MI (myocardial infarction) ([facility] [address] [facility]     PAD (peripheral artery disease) ([facility] [address] [facility]     Pneumonia        Past Surgical History:   Procedure Laterality Date    CATARACT EXTRACTION Bilateral 09/24/2018    NJ EXC SKIN BENIG 2 1-3 CM TRUNK,ARM,LEG Left 6/28/2021    Procedure: EXCISION BIOPSY MASS OF LEFT WRIST;  Surgeon: Margaret Azevedo MD;  Location: [facility];  Service: [facility] VASCULAR SURGERY         History reviewed  No pertinent family history  Medications have been verified  Objective   /76   Pulse 102   Temp 99 4 °F (37 4 °C)   Resp 16   SpO2 97%   No LMP for male patient  Physical Exam     Physical Exam  Vitals and nursing note reviewed  Constitutional:       General: He is not in acute distress  Appearance: He is well-developed  He is not diaphoretic     Pulmonary:      Effort: Pulmonary effort is normal    Abdominal:      General: Bowel sounds are normal       Palpations: Abdomen is soft  Tenderness: There is no abdominal tenderness  There is no right CVA tenderness, left CVA tenderness or guarding  Skin:     General: Skin is warm and dry  Neurological:      Mental Status: He is alert and oriented to person, place, and time

## 2022-03-06 LAB — BACTERIA UR CULT: ABNORMAL

## (undated) DEVICE — GAUZE SPONGES,16 PLY: Brand: CURITY

## (undated) DEVICE — SUT VICRYL 3-0 SH 27 IN J416H

## (undated) DEVICE — PENCIL ELECTROSURG E-Z CLEAN -0035H

## (undated) DEVICE — SUT PROLENE 4-0 PC-3 18 IN 8634G

## (undated) DEVICE — VIAL DECANTER

## (undated) DEVICE — GLOVE INDICATOR PI UNDERGLOVE SZ 6.5 BLUE

## (undated) DEVICE — INTENDED FOR TISSUE SEPARATION, AND OTHER PROCEDURES THAT REQUIRE A SHARP SURGICAL BLADE TO PUNCTURE OR CUT.: Brand: BARD-PARKER SAFETY BLADES SIZE 15, STERILE

## (undated) DEVICE — TIBURON EXTREMITY SHEET: Brand: CONVERTORS

## (undated) DEVICE — SPECIMEN CONTAINER STERILE PEEL PACK

## (undated) DEVICE — CHLORAPREP HI-LITE 26ML ORANGE

## (undated) DEVICE — STRETCH BANDAGE: Brand: CURITY

## (undated) DEVICE — COBAN 4 IN STERILE

## (undated) DEVICE — STOCKINETTE IMPERVIOUS LG

## (undated) DEVICE — GLOVE SRG BIOGEL 7.5

## (undated) DEVICE — ELECTRODE BLADE MOD E-Z CLEAN 2.5IN 6.4CM -0012M

## (undated) DEVICE — SUT MONOCRYL 4-0 PS-2 27 IN Y426H

## (undated) DEVICE — ACE WRAP 3 IN UNSTERILE

## (undated) DEVICE — PLUMEPEN PRO 10FT

## (undated) DEVICE — GLOVE SRG BIOGEL 6.5

## (undated) DEVICE — OCCLUSIVE GAUZE STRIP,3% BISMUTH TRIBROMOPHENATE IN PETROLATUM BLEND: Brand: XEROFORM

## (undated) DEVICE — BETHLEHEM UNIVERSAL MINOR GEN: Brand: CARDINAL HEALTH